# Patient Record
Sex: FEMALE | Race: WHITE | NOT HISPANIC OR LATINO | Employment: FULL TIME | URBAN - NONMETROPOLITAN AREA
[De-identification: names, ages, dates, MRNs, and addresses within clinical notes are randomized per-mention and may not be internally consistent; named-entity substitution may affect disease eponyms.]

---

## 2017-02-15 ENCOUNTER — OFFICE VISIT (OUTPATIENT)
Dept: ORTHOPEDIC SURGERY | Facility: CLINIC | Age: 47
End: 2017-02-15

## 2017-02-15 DIAGNOSIS — L97.323 ANKLE ULCER, LEFT, WITH NECROSIS OF MUSCLE (HCC): Primary | ICD-10-CM

## 2017-02-15 PROCEDURE — 99205 OFFICE O/P NEW HI 60 MIN: CPT | Performed by: ORTHOPAEDIC SURGERY

## 2017-02-15 NOTE — PROGRESS NOTES
Chief Complaint   Patient presents with   • Left Ankle - Establish Care   • Wound Check             HPI the patient is here today for a second opinion of her left ankle.  Patient has been having issues with her left ankle for at least 3+ months.  She went in to see a local podiatrist in [facility] in October 2016.  She had a mass on the lateral aspect of the ankle.  Apparently she was told that she needed resection of this mass.  Dr. Landry perform the initial surgery.  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  This is not to be confused with a giant cell tumor was the patient thinks she had.  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  BeCause of her failure to heal she was seen by subsequent podiatrist Dr. Richardson who performed a debridement and attempted a secondary closure.  She continued to have pain and discomfort.  Difficulty with ambulation.  She was then seen at the [facility] and an MRI was obtained.  The MRI was negative for osteomyelitis.  The patient has subsequently developed and am asked as a infection.  She was placed on oral Augmentin as well as Levaquin.  Because of the refractory nature of the wound and it still difficulty in healing she underwent IV vancomycin with some resolution of her symptoms.  She has had several local debridements in the office under local anesthetic and even with a ring block.  All that those debridement stated was basically made this area bleed and then it would still fail to heal.  She's been having increasing pain and discomfort.  Some of the pain is out of proportion to her symptoms and her diagnoses.  She had been thinking about getting a second opinion because of failure of this skin and soft tissue region to heal.  Patient is concerned that she may have to have aggressive surgeries and resection off the underlying bone to allow  tissues to heal.  Apparently she was told that a placental graft would be an option which I have strongly advised against because I feel like that is mostly experimental surgery and at this point a clean wound will heal and a good environment for healing is what she needs.  She is a heavy smoker and I have counseled her extensively to cut back on the smoking at least till/time that the wound is undergoing healing because the use of nicotine strongly discourage his wound healing in the peripheral tissues especially in the lower extremities.  This has been emphasized by me on her in a manner which leaves no doubt.          Allergies   Allergen Reactions   • Shellfish-Derived Products          Social History     Social History   • Marital status: Single     Spouse name: N/A   • Number of children: N/A   • Years of education: N/A     Occupational History   • Not on file.     Social History Main Topics   • Smoking status: Not on file   • Smokeless tobacco: Not on file   • Alcohol use Not on file   • Drug use: Not on file   • Sexual activity: Not on file     Other Topics Concern   • Not on file     Social History Narrative   • No narrative on file       No family history on file.    Past Surgical History   Procedure Laterality Date   • Tumor removal         No past medical history on file.        There were no vitals filed for this visit.          Review of Systems   Constitutional: Negative for activity change, appetite change, fatigue, fever and unexpected weight change.   HENT: Negative for congestion, sneezing and voice change.    Eyes: Negative for visual disturbance.   Respiratory: Negative for cough, chest tightness and wheezing.    Cardiovascular: Negative for chest pain, palpitations and leg swelling.   Gastrointestinal: Negative for abdominal distention, constipation, diarrhea and vomiting.   Endocrine: Negative for polydipsia, polyphagia and polyuria.   Genitourinary: Negative for decreased urine volume,  difficulty urinating, dysuria, flank pain and frequency.   Musculoskeletal: Positive for joint swelling and myalgias. Negative for arthralgias, back pain, gait problem, neck pain and neck stiffness.   Skin: Positive for wound. Negative for color change and pallor.   Allergic/Immunologic: Negative for environmental allergies and food allergies.   Neurological: Positive for weakness. Negative for dizziness and headaches.   Hematological: Does not bruise/bleed easily.   Psychiatric/Behavioral: Positive for sleep disturbance. Negative for agitation, confusion and decreased concentration. The patient is not nervous/anxious.            Physical Exam   Constitutional: She is oriented to person, place, and time. She appears well-developed and well-nourished.   HENT:   Head: Normocephalic.   Eyes: Conjunctivae are normal. Pupils are equal, round, and reactive to light.   Neck: Normal range of motion. Neck supple. No JVD present.   Cardiovascular: Normal rate, normal heart sounds and intact distal pulses.    Pulmonary/Chest: Effort normal and breath sounds normal. No respiratory distress.   Abdominal: Soft. Bowel sounds are normal. There is no tenderness. There is no guarding.   Musculoskeletal: She exhibits edema, tenderness and deformity.   Lymphadenopathy:     She has no cervical adenopathy.   Neurological: She is alert and oriented to person, place, and time. She has normal reflexes.   Skin: Skin is warm and dry.   Psychiatric: Her behavior is normal. Judgment and thought content normal.   Vitals reviewed.              Joint/Body Part Specific Exam:  Left ankle: The syndesmosis is stable.  Dorsiflexion is restricted because of immobilization in a cam walking boot.  She can dorsiflex on 0-10° which is distinctly less than the contralateral side.  The patient is able to circumduct the ankle but very slowly and painfully so.  There is no evidence of abscess formation.  There are some margins which are read and reflect  underlying cellulitis.  There is no purulence or lymphangitis.  Neurovascular status is intact.  Dorsalis pedis artery pulses are palpable.  Patients gait is cautious and she is very reluctant to bear any weight on this • Jeanette she has not used this lower extremity for weightbearing for at least 2 months.  Some signs of early RSD can be construed to be present.  Clinically she does not appear to have osteomyelitis of the distal fibular the lateral malleolus at this point.          X-RAY Report:  Films from an outside facility show a fairly normal contour of the ankle bones.  There is some increased soft tissue swelling laterally which is consistent with the multiple surgeries and the slow healing of the ulcer on the lateral side.          Diagnostics:  [facility] from the [facility] shows that she has tenosynovitis of the EDL tendon.  There is a soft tissue defect of the lateral malleolus which is consistent with the area of debridement and resection of the mass.  There is no evidence of abscess formation or fluid collection.  There is no evidence of osteomyelitis of the distal fibula.  The ligaments appear to be intact.    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    Karly was seen today for wound check and establish care.    Diagnoses and all orders for this visit:    Ankle ulcer, left, with necrosis of muscle          Procedures          I provided this patient with educational materials regarding bone health.        Plan: Cut back on the smoking to the point of quitting immediately to allow the tissues to heal and improve the local microvascular circulation.    CBC with differential.    Sedimentation rate.    C-reactive protein.    Daily dressing changes with aqua cell dressing.    She is completed her  course of oral antibiotics.    Reviewed the progress of healing of the wound within 2 weeks.    If she is unable to get this area to heal then she will be a candidate for a debridement of the underlying bone and the fascia and a secondary closure.    If a second attempt to get this ulcer to heal fails then she will need to be seen by plastic surgical colleague.  She may require either localized skin grafts or even a full-thickness flap if the ulcer is resistant to heal.    Extensive amount of time spent with this patient today in the office reviewing the notes discussing treatment options examining the patient examining the pathology reports.  Time spent in the office with the patient today 60 minutes.        CC To Juwan Ram, DO

## 2017-02-16 ENCOUNTER — TELEPHONE (OUTPATIENT)
Dept: ORTHOPEDIC SURGERY | Facility: CLINIC | Age: 47
End: 2017-02-16

## 2017-02-24 PROBLEM — L97.309 ANKLE ULCER: Status: ACTIVE | Noted: 2017-02-24

## 2017-03-02 ENCOUNTER — OFFICE VISIT (OUTPATIENT)
Dept: ORTHOPEDIC SURGERY | Facility: CLINIC | Age: 47
End: 2017-03-02

## 2017-03-02 DIAGNOSIS — L97.322 ANKLE ULCER, LEFT, WITH FAT LAYER EXPOSED (HCC): Primary | ICD-10-CM

## 2017-03-02 PROCEDURE — 99213 OFFICE O/P EST LOW 20 MIN: CPT | Performed by: ORTHOPAEDIC SURGERY

## 2017-03-02 NOTE — PROGRESS NOTES
Chief Complaint   Patient presents with   • Left Ankle - Follow-up   • Wound Check           HPI The patient is here today for a follow up of her left ankle.  She still has this ulcer which is persisting since late fall of 2016.  Overall the wound seems to be slightly better.  She has no fevers or chills.  She does not recall any abcess formation or purulence.  Her ankle range of motion is definitely improved since she is not wearing the cam walking boot.  She is still very highly sensitive on the lateral side of the ankle over the distal fibula and within a 5 cm circumference area of the ulcer.  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        There were no vitals filed for this visit.        Review of Systems   All other systems reviewed and are negative.          Physical Exam   Constitutional: She is oriented to person, place, and time. She appears well-developed and well-nourished.   HENT:   Head: Normocephalic.   Nose: Nose normal.   Eyes: Pupils are equal, round, and reactive to light.   Neck: Normal range of motion.   Cardiovascular: Normal rate and intact distal pulses.    Pulmonary/Chest: Effort normal and breath sounds normal.   Abdominal: Soft. Bowel sounds are normal.   Musculoskeletal: Normal range of motion. She exhibits edema and tenderness. She exhibits no deformity.   Neurological: She is alert and oriented to person, place, and time. She has normal reflexes.   Skin: Skin is warm. Rash noted. There is erythema.   Psychiatric: She has a normal mood and affect. Her behavior is normal. Thought content normal.   Nursing note and vitals reviewed.          Joint/Body Part Specific Exam:  Left ankle: There is a 2 cm diameter ulcer located exactly over the prominence of the lateral malleolus.  There is a surrounding  area of erythema and some rest.  There is no purulence.  No drainage from this area.  There is yellowish granulation tissue in the base of the ulcer.  The patient is very reluctant to allow us and debridement in the office because she had a similar debridement by one of the podiatrist she saw before that causes her a lot of pain swelling and bleeding.  Neurovascularly intact.  She does not demonstrate any tenderness consistent with osteomyelitis of the distal fibula.      X-RAY Report:        Diagnostics:        Karly was seen today for wound check and follow-up.    Diagnoses and all orders for this visit:    Ankle ulcer, left, with fat layer exposed          Procedures        Plan:  Wound care with Aquacel dressing changes.    Elevation to control swelling.  Ace wrap to decrease the edema.  She has completed her course of oral antibiotics.  Extensive discussion with the patient about different treatment options and whether we are going to go forwards with I&D and secondary closure of the wound which can be performed by me for a skin graft versus a vascularized tissue transfer performed by a plastic surgeon.  Patient is going to think about these options and let us know which when she is more comfortable with.  Personally I think this is an area which is situated over a bony prominence and would be better suited to a full tissue transfer and possibly a vascularized soft tissue graft.    Tablet ibuprofen 600 mg tab 1 by mouth twice a day for Pain and discomfort.     Elevation to control swelling and to decrease the edema.    Follow-up in my office in 2 weeks for reevaluation and further decision making.  Continue with wound care and discussed the possibility of attaching the patient to a wound clinic as well.

## 2017-03-16 ENCOUNTER — OFFICE VISIT (OUTPATIENT)
Dept: ORTHOPEDIC SURGERY | Facility: CLINIC | Age: 47
End: 2017-03-16

## 2017-03-16 DIAGNOSIS — L97.322 ANKLE ULCER, LEFT, WITH FAT LAYER EXPOSED (HCC): Primary | ICD-10-CM

## 2017-03-16 PROCEDURE — 99213 OFFICE O/P EST LOW 20 MIN: CPT | Performed by: ORTHOPAEDIC SURGERY

## 2017-03-16 NOTE — PROGRESS NOTES
Chief Complaint   Patient presents with   • Left Ankle - Follow-up   • Wound Check           HPI  Patient is returning today for a wound check.  The wound is improving.  Patient does not have any fevers, rigors, or chills. She has completed her course of oral antibiotics. The skin and soft tissue condition is improving very rapidly around this area. There is no abscess formation or drainage. Since she has weaned off the CAM walking boot, her gait has improved tremendously. Overall she is making excellent progressive with conservative, nonoperative care. The option for debridement and secondary closure of the wound has been discussed with the patient at several visits to the office.             There were no vitals filed for this visit.        Review of Systems   Constitutional: Negative for chills, diaphoresis, fever and unexpected weight change.   HENT: Negative for hearing loss, nosebleeds, sore throat and tinnitus.    Eyes: Negative for pain and visual disturbance.   Respiratory: Negative for cough, shortness of breath and wheezing.    Cardiovascular: Negative for chest pain and palpitations.   Gastrointestinal: Negative for abdominal pain, diarrhea, nausea and vomiting.   Endocrine: Negative for cold intolerance, heat intolerance and polydipsia.   Genitourinary: Negative for difficulty urinating, dysuria and hematuria.   Musculoskeletal: Negative for arthralgias, joint swelling and myalgias.   Skin: Negative for rash and wound.   Allergic/Immunologic: Negative for environmental allergies.   Neurological: Negative for dizziness, syncope and numbness.   Hematological: Does not bruise/bleed easily.   Psychiatric/Behavioral: Negative for dysphoric mood and sleep disturbance. The patient is not nervous/anxious.            Physical Exam   Constitutional: She appears well-developed.   HENT:   Head: Normocephalic.   Eyes: Conjunctivae are normal. Pupils are equal, round, and reactive to light.   Neck: Normal range of  motion.   Cardiovascular: Normal rate and intact distal pulses.    Pulmonary/Chest: Effort normal and breath sounds normal.   Abdominal: Soft.   Musculoskeletal: Normal range of motion.   Neurological: She is alert. She has normal reflexes.   Skin: Skin is warm.   Psychiatric: She has a normal mood and affect. Her behavior is normal. Judgment and thought content normal.   Nursing note and vitals reviewed.          Joint/Body Part Specific Exam:     Left ankle: The patient has a very small area of unhealed ulcer/wound over the medial malleolus. There is no purulence. There is no local drainage. There is no lymphangitis. The cellulitis has almost completely resolved. There is some underlying granulation tissue, which is starting to heal. There is some coverage of the overlying skin with reepithelization. Dorsalis pedis and posterior tibial artery pulses are palpable. Dorsiflexion is 0° to 20°. Plantar flexion is 0° to 30° and she is able to circumduct the ankle without too much difficulty.          X-RAY Report:        Diagnostics:        Karly was seen today for wound check and follow-up.    Diagnoses and all orders for this visit:    Ankle ulcer, left, with fat layer exposed          Procedures        Plan:   Continue with wound care.    Elevation to control swelling.    Aquacel dressings locally to keep the wound clean and dry.    She has completed her oral course of doxycycline for antibiotic prophylaxis.    Wean off the CAM walking boot.    Fall precautions.    Ace wrap from toes to groin.    I have discussed with the patient about incision and drainage and secondary closure of this wound.    Also discussed with the patient about referral to a plastic surgery service.    Patient feels that she is making excellent progressive with conservative nonoperative care and, therefore, I feel like we can continue with careful close observation before surgical intervention.    Follow up at the wound care clinic on a weekly  basis.    Follow up in my office in 4 weeks.    She does not demonstrate any sign of an underlying osteomyelitis over the medial tibia.

## 2017-04-13 ENCOUNTER — OFFICE VISIT (OUTPATIENT)
Dept: ORTHOPEDIC SURGERY | Facility: CLINIC | Age: 47
End: 2017-04-13

## 2017-04-13 DIAGNOSIS — L97.322 ANKLE ULCER, LEFT, WITH FAT LAYER EXPOSED (HCC): Primary | ICD-10-CM

## 2017-04-13 DIAGNOSIS — Z71.6 ENCOUNTER FOR SMOKING CESSATION COUNSELING: ICD-10-CM

## 2017-04-13 PROCEDURE — 99214 OFFICE O/P EST MOD 30 MIN: CPT | Performed by: ORTHOPAEDIC SURGERY

## 2017-04-13 NOTE — PROGRESS NOTES
Chief Complaint   Patient presents with   • Left Ankle - Follow-up   • Wound Check           HPI  Patient is here for a follow up of her left ankle.  The wound closed last week.  Patient is doing well at this point.  There are no fevers rigors or chills.  There is no drainage from the ulcer.  Her pain has decreased considerably.  Patient continues to smoke and I have counseled her to cut back on her exposure nicotine because this is affecting her soft tissues negatively and decreasing the possibility of early healing of the wound.  The possibility of skin breakdown and recurrence of the ulcer cannot be under stated to the patient and the smoking certainly predisposes her to that.  Overall she is very pleased with her outcome because her ankle range of motion is improved significantly and her wound is healing nicely with conservative, nonoperative care.    There were no vitals filed for this visit.        Review of Systems   Constitutional: Negative for chills, diaphoresis, fever and unexpected weight change.   HENT: Negative for hearing loss, nosebleeds, sore throat and tinnitus.    Eyes: Negative for pain and visual disturbance.   Respiratory: Negative for cough, shortness of breath and wheezing.    Cardiovascular: Negative for chest pain and palpitations.   Gastrointestinal: Negative for abdominal pain, diarrhea, nausea and vomiting.   Endocrine: Negative for cold intolerance, heat intolerance and polydipsia.   Genitourinary: Negative for difficulty urinating, dysuria and hematuria.   Musculoskeletal: Negative for arthralgias, joint swelling and myalgias.   Skin: Negative for rash and wound.   Allergic/Immunologic: Negative for environmental allergies.   Neurological: Negative for dizziness, syncope and numbness.   Hematological: Does not bruise/bleed easily.   Psychiatric/Behavioral: Negative for dysphoric mood and sleep disturbance. The patient is not nervous/anxious.    All other systems reviewed and are  negative.          Physical Exam   Constitutional: She is oriented to person, place, and time. She appears well-developed and well-nourished.   HENT:   Head: Normocephalic.   Eyes: Pupils are equal, round, and reactive to light.   Neck: Normal range of motion.   Cardiovascular: Intact distal pulses.    Pulmonary/Chest: Breath sounds normal.   Abdominal: Soft. Bowel sounds are normal.   Musculoskeletal: Normal range of motion. She exhibits edema and tenderness.   Neurological: She is alert and oriented to person, place, and time. She has normal reflexes.   Skin: Skin is warm and dry.   Psychiatric: She has a normal mood and affect. Her behavior is normal. Judgment and thought content normal.   Nursing note and vitals reviewed.          Joint/Body Part Specific Exam:  Left ankle: The ulcer has now become less than 5 mm in diameter.  There is no local drainage.  The ulcer is located over the prominence of the lateral malleolus laterally.  There is no purulence.  There is no lymphangitis.  No abscess formation.  Ankle dorsiflexion 0-20° ankle plantar flexion 0-30° the mortise is stable.  She is able to circumduct the ankle without any difficulty whatsoever there is no evidence of a DVT.      X-RAY Report:        Diagnostics:        Karly was seen today for wound check and follow-up.    Diagnoses and all orders for this visit:    Ankle ulcer, left, with fat layer exposed          Procedures        Plan:  Wound care.    Daily dressing changes.    Weightbearing as tolerated.    Calcium and vitamin D for bone health.    Patient has been counseled to cut back on smoking and I have spent at least 10 minutes on counseling her with regards to minimizing exposure nicotine and looking at either chewing arms or patches or psychotherapy or hypnotic therapy and eventually just a farm resolve for her to quit smoking.    Elevation to control swelling.    Tablet aspirin 325 mg tab 1 by mouth daily for DVT prophylaxis.    Follow-up in  my office in 6 weeks.    At this point the chances that she is going to need surgical intervention to allow that wound to close over are reviewed with patient.      She has done very well with conservative nonoperative care and she is very excited that for the first time she is very mobile in the last 3-4 months without the pain and discomfort in the drainage from her ulcer on the lateral side of the ankle.

## 2017-04-19 PROBLEM — Z71.6 ENCOUNTER FOR SMOKING CESSATION COUNSELING: Status: ACTIVE | Noted: 2017-04-19

## 2017-06-15 ENCOUNTER — OFFICE VISIT (OUTPATIENT)
Dept: ORTHOPEDIC SURGERY | Facility: CLINIC | Age: 47
End: 2017-06-15

## 2017-06-15 DIAGNOSIS — L97.321 ANKLE ULCER, LEFT, LIMITED TO BREAKDOWN OF SKIN (HCC): Primary | ICD-10-CM

## 2017-06-15 DIAGNOSIS — M75.02 ADHESIVE CAPSULITIS OF LEFT SHOULDER: ICD-10-CM

## 2017-06-15 PROCEDURE — 99213 OFFICE O/P EST LOW 20 MIN: CPT | Performed by: ORTHOPAEDIC SURGERY

## 2017-06-15 NOTE — PROGRESS NOTES
Chief Complaint   Patient presents with   • Left Ankle - Follow-up   • Wound Check           HPI the patient is here today for a follow up of her left ankle. The ulcer on the medial aspect the ankle has fully settled down and healed.  There is brand-new skin with epithelial tissue resurfacing this area.  There is no evidence of osteomyelitis underlying abscess formation.  She is very pleased with her outcome.  The patient states that her left shoulder is bothering her significantly.  She has weakness in abduction and limitations of range of motion.  She finds it extremely difficult to rotate the shoulder internally and reach into the small of her back.  She also has difficulty in reaching into the top of her head because the shoulder resists any form of rotation.  She has difficulty in sleeping in bed at night because of the pain.  When she lays in a dependent position there is a lot of pain and discomfort.  She denies any history of trauma other than flipping over her stool when she was applying her makeup.  Events happened so quickly that she does not recall if she traumatized the left upper extremity but seems to have worsened her symptoms since that incident.  I have discussed with her extensively about needing an MRI of the shoulder for delineation of pathology and then proceeding with physical therapy, intra-articular steroid injection, manipulation under anesthesia and lastly potential surgical intervention in the form of an arthroscopy.        There were no vitals filed for this visit.        Review of Systems   Constitutional: Negative.    HENT: Negative.    Eyes: Negative.    Respiratory: Negative.    Cardiovascular: Negative.    Gastrointestinal: Negative.    Endocrine: Negative.    Genitourinary: Negative.    Musculoskeletal: Negative.    Skin: Negative.    Allergic/Immunologic: Negative.    Neurological: Negative.    Hematological: Negative.    Psychiatric/Behavioral: Negative.            Physical Exam    Constitutional: She is oriented to person, place, and time. She appears well-nourished.   HENT:   Head: Atraumatic.   Eyes: EOM are normal.   Neck: Neck supple.   Cardiovascular: Normal rate and intact distal pulses.    Pulmonary/Chest: Effort normal and breath sounds normal.   Abdominal: Soft. Bowel sounds are normal.   Musculoskeletal: Normal range of motion. She exhibits edema and tenderness.   Neurological: She is alert and oriented to person, place, and time. She has normal reflexes.   Skin: Skin is dry.   Psychiatric: She has a normal mood and affect. Her behavior is normal. Judgment and thought content normal.   Nursing note reviewed.          Joint/Body Part Specific Exam:  Left lower extremity: Ankle dorsiflexion is now near normal and comparable to the opposite side both in dorsiflexion as well as and plantarflexion.  Patient is able to circumduct the ankle without any difficulty whatsoever.  The ulcer has completely healed with a brand-new layer of epithelial tissue on the surface.  There is no evidence of an infection or lymphangitis.    left Shoulder. Patient has signs of impingement with internal and external rotation. There is a lot of pain and tenderness for the patient over the subcromial bursa. Skinner’s sign is positive. Neer sign is positive. Forward flexion is 0-60 degrees, abduction is 0-60° degrees, external rotation is 0-30° degrees. Rotator cuff function is fairly well preserved except for the impingement at 90 degrees. Apprehension sign is negative. Axillary nerve function is well preserved. Radial artery pulses are palpable. There is no evidence of multidirectional instability. Sulcus sign is negative. Drop arm sign is negative. The patient has some ill-defined tenderness over the greater tuberosity of the humerus. The pain level is 5.      X-RAY Report:        Diagnostics:        Karly was seen today for wound check and follow-up.    Diagnoses and all orders for this visit:    Ankle  ulcer, left, limited to breakdown of skin    Adhesive capsulitis of left shoulder            Procedures        Plan:  Care of the ulcer.    Elevation to control swelling.    Gentle active mobilization of the ankle to prevent stiffness.    Active aggressive range of motion of the left shoulder because she is starting to develop an early frozen shoulder.    Reverse symptoms do not improve with a home based physical therapy program she is going to need an MRI of the shoulder to make sure she does not have a rotator cuff tear causing the problem in the shoulder.    Patient will let me know when she is ready for the shoulder MRI.    Tablet ibuprofen 600 mg tab 1 by mouth twice a day when necessary pain and discomfort.    Patient needs to cut back on her smoking to prevent the recurrence of the ulcer on the distal tibia.    Calcium and vitamin D for bone health.    Follow-up in my office in 8 weeks for reevaluation.

## 2017-06-23 PROBLEM — M75.02 ADHESIVE CAPSULITIS OF LEFT SHOULDER: Status: ACTIVE | Noted: 2017-06-23

## 2017-08-03 ENCOUNTER — OFFICE VISIT (OUTPATIENT)
Dept: ORTHOPEDIC SURGERY | Facility: CLINIC | Age: 47
End: 2017-08-03

## 2017-08-03 DIAGNOSIS — M75.02 ADHESIVE CAPSULITIS OF LEFT SHOULDER: Primary | ICD-10-CM

## 2017-08-03 DIAGNOSIS — S43.005A DISLOCATION OF LEFT SHOULDER WITH INJURY OF BRACHIAL PLEXUS, INITIAL ENCOUNTER: ICD-10-CM

## 2017-08-03 DIAGNOSIS — S14.3XXA DISLOCATION OF LEFT SHOULDER WITH INJURY OF BRACHIAL PLEXUS, INITIAL ENCOUNTER: ICD-10-CM

## 2017-08-03 PROCEDURE — 99213 OFFICE O/P EST LOW 20 MIN: CPT | Performed by: ORTHOPAEDIC SURGERY

## 2017-08-03 NOTE — PROGRESS NOTES
Chief Complaint   Patient presents with   • Left Ankle - Follow-up   Patient is here today following up on her left ankle.     HPI patient is been having pain and discomfort in the left shoulder.  She fell at her daughter's wedding in June.  She landed awkwardly on the left side.  Since that time she has lost significant motion beyond 90° of abduction.  She finds it difficult to sleep in bed at night because of the pain and discomfort.  The pain is a baseline dull ache associated with a sharp stabbing pain.  It is worse with attempted abduction.  The pain is progressively getting worse and is associated with limited range of motion of the shoulder.  She has tried some chiropractic manipulation of the shoulder but things are not getting any better.  Both the patient and myself are concerned that she might have a rotator cuff tear.  We will investigate this with an MRI for diagnostic purposes.        There were no vitals filed for this visit.        Review of Systems   Constitutional: Negative.    HENT: Negative.    Eyes: Negative.    Respiratory: Negative.    Cardiovascular: Negative.    Gastrointestinal: Negative.    Endocrine: Negative.    Genitourinary: Negative.    Musculoskeletal: Negative.    Skin: Negative.    Allergic/Immunologic: Negative.    Neurological: Negative.    Hematological: Negative.    Psychiatric/Behavioral: Negative.            Physical Exam   Constitutional: She is oriented to person, place, and time. She appears well-nourished.   HENT:   Head: Atraumatic.   Eyes: EOM are normal.   Neck: Neck supple.   Cardiovascular: Normal rate and regular rhythm.    Abdominal: Soft. Bowel sounds are normal.   Musculoskeletal: She exhibits edema and tenderness.   Neurological: She is alert and oriented to person, place, and time. She has normal reflexes.   Skin: Skin is dry.   Psychiatric: She has a normal mood and affect. Her behavior is normal. Judgment and thought content normal.   Nursing note and vitals  reviewed.          Joint/Body Part Specific Exam:  left shoulder. The shoulder is extremely tender anteriorly. There is tenderness over the insertion of the rotator cuff over the greater tuberosity of the humerus. Slight proximal migration of the humeral head is noted. AC joint is tender. Crossover adduction test is positive. Drop arm sign is positive. Forward flexion is 0-90 degrees, abduction is 0-90° degrees, external rotation is 0-40 degrees. Patient has mild atrophy both of the supra and infraspinatus fossa. Sulcus sign is negative. There is no evidence of multidirectional instability. Neer test is positive on compression. Skin and soft tissue tenderness is noted. Patient’s strength in abduction and external rotation are extremely lacking. The pain level is 5.      X-RAY Report:        Diagnostics:        Karly was seen today for follow-up.    Diagnoses and all orders for this visit:    Adhesive capsulitis of left shoulder          Procedures        Plan:  Patient's ankle ulcer has completely healed.    Stretching sensing exercises of the left shoulder to prevent a frozen shoulder.    Calcium and vitamin D for bone health.    Tablet ibuprofen 600 mg tab 1 by mouth twice a day when necessary pain and discomfort.    Schedule an MRI of the shoulder for evaluation of rotator cuff pathology.  If the patient does have a rotator cuff tear she will be a candidate for surgical repair of the rotator cuff tear.  If she does not have a tear than we could get by with physical therapy and maybe mobilization of the shoulder to break down the adhesions followed by aggressive physical therapy.    Follow-up in my office after the MRIs done and then we'll proceed with further decision making.

## 2017-08-17 ENCOUNTER — TELEPHONE (OUTPATIENT)
Dept: ORTHOPEDIC SURGERY | Facility: CLINIC | Age: 47
End: 2017-08-17

## 2017-08-23 ENCOUNTER — TELEPHONE (OUTPATIENT)
Dept: ORTHOPEDIC SURGERY | Facility: CLINIC | Age: 47
End: 2017-08-23

## 2017-09-28 ENCOUNTER — TELEPHONE (OUTPATIENT)
Dept: ORTHOPEDIC SURGERY | Facility: CLINIC | Age: 47
End: 2017-09-28

## 2022-08-31 ENCOUNTER — TRANSCRIBE ORDERS (OUTPATIENT)
Dept: ADMINISTRATIVE | Facility: HOSPITAL | Age: 52
End: 2022-08-31

## 2022-08-31 DIAGNOSIS — Z12.31 SCREENING MAMMOGRAM FOR HIGH-RISK PATIENT: Primary | ICD-10-CM

## 2022-09-07 ENCOUNTER — APPOINTMENT (OUTPATIENT)
Dept: MAMMOGRAPHY | Facility: HOSPITAL | Age: 52
End: 2022-09-07

## 2023-02-09 NOTE — TELEPHONE ENCOUNTER
Spoke to patient    Hydroquinone Pregnancy And Lactation Text: This medication has not been assigned a Pregnancy Risk Category but animal studies failed to show danger with the topical medication. It is unknown if the medication is excreted in breast milk.